# Patient Record
Sex: FEMALE | Race: WHITE
[De-identification: names, ages, dates, MRNs, and addresses within clinical notes are randomized per-mention and may not be internally consistent; named-entity substitution may affect disease eponyms.]

---

## 2018-05-18 ENCOUNTER — HOSPITAL ENCOUNTER (EMERGENCY)
Dept: HOSPITAL 68 - ERH | Age: 63
End: 2018-05-18
Payer: COMMERCIAL

## 2018-05-18 VITALS — SYSTOLIC BLOOD PRESSURE: 122 MMHG | DIASTOLIC BLOOD PRESSURE: 82 MMHG

## 2018-05-18 VITALS — BODY MASS INDEX: 39.27 KG/M2 | HEIGHT: 60 IN | WEIGHT: 200 LBS

## 2018-05-18 DIAGNOSIS — K59.00: ICD-10-CM

## 2018-05-18 DIAGNOSIS — R10.84: Primary | ICD-10-CM

## 2018-05-18 NOTE — RADIOLOGY REPORT
EXAMINATION:
ABDOMEN 1 VIEW
 
CLINICAL INFORMATION:
Constipation.
 
COMPARISON:
None.
 
TECHNIQUE:
A supine view of the abdomen is provided.
 
FINDINGS:
There are no dilated loops of small bowel. There are no air-fluid levels.
There is no appendicolith. The visualized lung bases are clear. There is mild
medial hip joint space narrowing bilaterally with mild lateral acetabular
osteophyte formation. The osseous structures are otherwise unremarkable.
 
IMPRESSION:
Unremarkable bowel gas pattern.

## 2018-05-18 NOTE — ED GI/GU/ABDOMINAL COMPLAINT
History of Present Illness
 
General
Chief Complaint: Abdominal Pain/Flank Pain
Stated Complaint: CONSTIPATION AND ABD PAIN
Source: patient, family, old records
Exam Limitations: no limitations
 
Vital Signs & Intake/Output
Vital Signs & Intake/Output
 Vital Signs
 
 
Date Time Temp Pulse Resp B/P B/P Pulse O2 O2 Flow FiO2
 
     Mean Ox Delivery Rate 
 
05/18 0351 99.4 94 18 126/84  96 Room Air  
 
 
 
Allergies
Coded Allergies:
NO KNOWN ALLERGIES (03/21/14)
 
Reconcile Medications
Polyethylene Glycol 3350 (Miralax) 17 GRAM/DOSE POWDER   17 GM PO DAILY 
constipation
     mix with water, juice, soda, coffee or tea
     use until stools soft and regular
 
Triage Note:
PT HERE WITH C/O ABD PAIN X 1 WEEK. PT REPORTS
 HAVING RIGHT SHOULDER REPLACEMENT ON 5/4/18 AND
 BEING ON DILAUDID FOR PAIN. PT REPORTS HAVING BEEN
 CONSTIPATED AND WAS GIVEN MAG [place] A WEEK AGO
 WITH RELIEF. PT STATES THAT THE ABD PAIN HAS BEEN
 GETTING WORSE ALTHOUGH SHE HAS HAD A SMALL BM
 TODAY. PT REPORTS HX OF DIVERTICULITIS BUT STATES
 THAT THIS DOES NOT FEEL THE SAME.
Triage Nurses Notes Reviewed? yes
LMP (ages 10-50): post menopausal
Pregnant? n
Is pt currently breastfeeding? No
Onset: Last week
Duration: week(s):, constant, continues in ED, getting worse
Timing: recent history
Quality/Severity: aching, fullness, moderate
Location: generalized abdomen
Radiation: no radiation
Activities at Onset: none
Prior Abdominal Problems: none
Past Sexual History: Unobtainable at this time
No Modifying Factors: none
Associated Symptoms: abdominal pain
HPI:
2 weeks prior to admission patient had right shoulder replacement taking 
Dilaudid for analgesia.
Since this time she's been having difficulty passing stool with increasing 
abdominal distention.
Several days prior to admission she stopped Dilaudid.
Denies fever chills nausea vomiting diarrhea chest pain cough shortness breath 
headache dysuria rash bleeding.
 
Past History
 
Travel History
Traveled to [place] past 21 day No
 
Medical History
Any Pertinent Medical History? see below for history
Gastrointestinal: diverticulitis
 
Surgical History
Surgical History: non-contributory
 
Psychosocial History
What is your primary language English
Tobacco Use: Never used
ETOH Use: denies use
Illicit Drug Use: denies illicit drug use
 
Family History
Hx Contributory? No
 
Review of Systems
 
Review of Systems
Constitutional:
Reports: no symptoms. 
EENTM:
Reports: no symptoms. 
Respiratory:
Reports: no symptoms. 
Cardiovascular:
Reports: no symptoms. 
GI:
Reports: see HPI, abdominal pain, constipation. 
Genitourinary:
Reports: no symptoms. 
Musculoskeletal:
Reports: no symptoms. 
Skin:
Reports: no symptoms. 
Neurological/Psychological:
Reports: no symptoms. 
Hematologic/Endocrine:
Reports: no symptoms. 
Immunologic/Allergic:
Reports: no symptoms. 
All Other Systems: Reviewed and Negative
 
Physical Exam
 
Physical Exam
General Appearance: well developed/nourished, alert, awake, anxious, obese
Head: atraumatic, normal appearance
Eyes:
Bilateral: normal appearance, PERRL, EOMI, normal inspection. 
Ears, Nose, Throat, Mouth: hearing grossly normal, moist mucous membrane
Neck: normal inspection, supple, full range of motion, normal alignment
Respiratory: normal breath sounds, chest non-tender, no respiratory distress, 
quiet respiration, lungs clear
Cardiovascular: regular rate/rhythm, normal peripheral pulses, norml femoral 
pulses equa
Peripheral Pulses:
4+ carotid (R), 4+ carotid (L)
Gastrointestinal: normal bowel sounds, soft, non-tender, no organomegaly
Back: normal inspection, normal range of motion
Extremities: no ligament instability
Neurologic/Psych: no motor/sensory deficits, awake, alert, oriented x 3, normal 
gait, normal mood/affect, CNs II-XII nml as tested
Skin: intact, normal color, warm/dry
 
Core Measures
ACS in differential dx? No
Sepsis Present: No
Sepsis Focused Exam Completed? No
 
Progress
Differential Diagnosis: bowel obstruction, gastritis, SBO
Plan of Care:
 Orders
 
 
Procedure Date/time Status
 
Enema 05/18 0540 Active
 
 
 
Diagnostic Imaging:
Viewed by Me: Radiology Read.  Discussed w/RAD: Radiology Read. 
Radiology Impression: Unremarkable bowel gas pattern.
Initial ED EKG: none
Comments:
Better after enema
 
Departure
 
Departure
Time of Disposition: 0656
Disposition: HOME OR SELF CARE
Condition: Stable
Clinical Impression
Primary Impression: Constipation due to pain medication
Referrals:
Emiliano ENRIQUEZ,Fran TOMPKINS (PCP/Family)
 
Departure Forms:
Customer Survey
General Discharge Information
Prescriptions:
Current Visit Scripts
Polyethylene Glycol 3350 (Miralax) 17 GM PO DAILY  
     #527 GM 
     mix with water, juice, soda, coffee or tea
     use until stools soft and regular